# Patient Record
Sex: FEMALE | URBAN - METROPOLITAN AREA
[De-identification: names, ages, dates, MRNs, and addresses within clinical notes are randomized per-mention and may not be internally consistent; named-entity substitution may affect disease eponyms.]

---

## 2020-12-02 ENCOUNTER — NURSE TRIAGE (OUTPATIENT)
Dept: NURSING | Facility: CLINIC | Age: 20
End: 2020-12-02

## 2020-12-03 NOTE — TELEPHONE ENCOUNTER
Patient's roommate calling - advised that triager would need to speak with patient. Caller then hung up. No triage occurred.    Kathy Andujar RN on 12/2/2020 at 11:59 PM   No pertinent family history in first degree relatives